# Patient Record
Sex: FEMALE | Race: WHITE | NOT HISPANIC OR LATINO | URBAN - METROPOLITAN AREA
[De-identification: names, ages, dates, MRNs, and addresses within clinical notes are randomized per-mention and may not be internally consistent; named-entity substitution may affect disease eponyms.]

---

## 2024-07-02 ENCOUNTER — APPOINTMENT (RX ONLY)
Dept: URBAN - METROPOLITAN AREA CLINIC 94 | Facility: CLINIC | Age: 64
Setting detail: DERMATOLOGY
End: 2024-07-02

## 2024-07-02 DIAGNOSIS — Z41.9 ENCOUNTER FOR PROCEDURE FOR PURPOSES OTHER THAN REMEDYING HEALTH STATE, UNSPECIFIED: ICD-10-CM | Status: INADEQUATELY CONTROLLED

## 2024-07-02 PROCEDURE — ? COMPRESSION STOCKING FITTING

## 2024-07-02 PROCEDURE — ? PHOTO-DOCUMENTATION

## 2024-07-02 PROCEDURE — ? COSMETIC CONSULTATION: SCLEROTHERAPY

## 2024-07-02 PROCEDURE — ? COUNSELING

## 2024-07-02 PROCEDURE — ? OTC TREATMENT REGIMEN

## 2024-07-02 ASSESSMENT — LOCATION ZONE DERM: LOCATION ZONE: LEG

## 2024-07-02 ASSESSMENT — LOCATION DETAILED DESCRIPTION DERM
LOCATION DETAILED: LEFT PROXIMAL PRETIBIAL REGION
LOCATION DETAILED: RIGHT ANTERIOR DISTAL THIGH

## 2024-07-02 ASSESSMENT — LOCATION SIMPLE DESCRIPTION DERM
LOCATION SIMPLE: RIGHT THIGH
LOCATION SIMPLE: LEFT PRETIBIAL REGION

## 2024-07-02 NOTE — PROCEDURE: COMPRESSION STOCKING FITTING
Stocking Type: Stockings
Strength: 20-30 mmHg
Toe Type: Open
Detail Level: Simple
Specific Size Ordered: small
Pantihose Type: Part A

## 2024-07-02 NOTE — PROCEDURE: OTC TREATMENT REGIMEN
Patient Specific Otc Recommendations (Will Not Stick From Patient To Patient): Dermaka cream
Detail Level: Zone

## 2024-07-02 NOTE — HPI: VEIN EVALUATION
[id number]
Do You Have A Family History Of Bleeding Disorders?: no
How Severe Is/Are Your Symptoms?: moderate
Is This A New Presentation, Or A Follow-Up?: Vein Evaluation
Additional History: She has not worn compression stockings. Most recent cosmetic treatments were 7 years ago locally. Four pregnancies.
Family History Of Vein Disease (Include Family Member And Type Of Vein Disease):: Parents
Referred By:: In office advertising
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

## 2024-07-15 ENCOUNTER — APPOINTMENT (RX ONLY)
Dept: URBAN - METROPOLITAN AREA CLINIC 95 | Facility: CLINIC | Age: 64
Setting detail: DERMATOLOGY
End: 2024-07-15

## 2024-07-15 DIAGNOSIS — I83.9 ASYMPTOMATIC VARICOSE VEINS OF LOWER EXTREMITIES: ICD-10-CM

## 2024-07-15 DIAGNOSIS — Z41.9 ENCOUNTER FOR PROCEDURE FOR PURPOSES OTHER THAN REMEDYING HEALTH STATE, UNSPECIFIED: ICD-10-CM

## 2024-07-15 PROBLEM — I83.90 ASYMPTOMATIC VARICOSE VEINS OF UNSPECIFIED LOWER EXTREMITY: Status: ACTIVE | Noted: 2024-07-15

## 2024-07-15 PROBLEM — I83.91 ASYMPTOMATIC VARICOSE VEINS OF RIGHT LOWER EXTREMITY: Status: ACTIVE | Noted: 2024-07-15

## 2024-07-15 PROCEDURE — ? OTHER

## 2024-07-15 PROCEDURE — ? SCLEROTHERAPY (COSMETIC)

## 2024-07-15 ASSESSMENT — LOCATION DETAILED DESCRIPTION DERM
LOCATION DETAILED: RIGHT PROXIMAL CALF
LOCATION DETAILED: RIGHT POSTERIOR ANKLE
LOCATION DETAILED: RIGHT DISTAL POSTERIOR THIGH
LOCATION DETAILED: RIGHT POPLITEAL SKIN
LOCATION DETAILED: RIGHT DISTAL MEDIAL CALF
LOCATION DETAILED: RIGHT MEDIAL POSTERIOR ANKLE
LOCATION DETAILED: RIGHT DISTAL CALF
LOCATION DETAILED: RIGHT DISTAL LATERAL CALF

## 2024-07-15 ASSESSMENT — LOCATION SIMPLE DESCRIPTION DERM
LOCATION SIMPLE: RIGHT POPLITEAL SKIN
LOCATION SIMPLE: RIGHT CALF
LOCATION SIMPLE: RIGHT ANKLE
LOCATION SIMPLE: RIGHT POSTERIOR THIGH

## 2024-07-15 ASSESSMENT — LOCATION ZONE DERM: LOCATION ZONE: LEG

## 2024-07-15 NOTE — PROCEDURE: SCLEROTHERAPY (COSMETIC)
Sclerosant Volume (Cc): 0
Price (Use Numbers Only, No Special Characters Or $): 300.00
Sclerosant Volume (Cc): 3
Sclerosant (A) %: 0.25
Detail Level: Detailed
Consent: Photographs were taken.

## 2024-07-15 NOTE — PROCEDURE: OTHER
Render Risk Assessment In Note?: no
Detail Level: Zone
Note Text (......Xxx Chief Complaint.): This diagnosis correlates with the
Other (Free Text): Compression bandage with 4x4 gauze and either Hypafix or Kerlix and Coban wrap was applied to superficial area of vasculature on area of concern after injection of sclerosant. Patient instructed to keep dressing in place for 24 hours then remove. Patient instructed not to allow bandage to get wet during that time. Educated patient to remove dressing sooner if there are any changes to skin color or limb sensation including but not limited to pallor, cyanosis, numbness, tingling and/or burning. All questions answered, no further questions at this time, patient verbalized understanding.

## 2024-08-29 ENCOUNTER — APPOINTMENT (RX ONLY)
Dept: URBAN - METROPOLITAN AREA CLINIC 95 | Facility: CLINIC | Age: 64
Setting detail: DERMATOLOGY
End: 2024-08-29

## 2024-08-29 DIAGNOSIS — I83.9 ASYMPTOMATIC VARICOSE VEINS OF LOWER EXTREMITIES: ICD-10-CM

## 2024-08-29 DIAGNOSIS — Z41.9 ENCOUNTER FOR PROCEDURE FOR PURPOSES OTHER THAN REMEDYING HEALTH STATE, UNSPECIFIED: ICD-10-CM

## 2024-08-29 DIAGNOSIS — I97.618 POSTPROCEDURAL HEMORRHAGE OF A CIRCULATORY SYSTEM ORGAN OR STRUCTURE FOLLOWING OTHER CIRCULATORY SYSTEM PROCEDURE: ICD-10-CM

## 2024-08-29 PROBLEM — I83.90 ASYMPTOMATIC VARICOSE VEINS OF UNSPECIFIED LOWER EXTREMITY: Status: ACTIVE | Noted: 2024-08-29

## 2024-08-29 PROCEDURE — ? SCLEROTHERAPY (COSMETIC)

## 2024-08-29 ASSESSMENT — LOCATION DETAILED DESCRIPTION DERM
LOCATION DETAILED: RIGHT PROXIMAL CALF
LOCATION DETAILED: RIGHT PROXIMAL MEDIAL CALF
LOCATION DETAILED: RIGHT POPLITEAL SKIN
LOCATION DETAILED: RIGHT POPLITEAL SKIN
LOCATION DETAILED: RIGHT DISTAL CALF
LOCATION DETAILED: RIGHT DISTAL POSTERIOR THIGH

## 2024-08-29 ASSESSMENT — LOCATION ZONE DERM
LOCATION ZONE: LEG
LOCATION ZONE: LEG

## 2024-08-29 ASSESSMENT — LOCATION SIMPLE DESCRIPTION DERM
LOCATION SIMPLE: RIGHT CALF
LOCATION SIMPLE: RIGHT POPLITEAL SKIN
LOCATION SIMPLE: RIGHT POSTERIOR THIGH
LOCATION SIMPLE: RIGHT POPLITEAL SKIN

## 2024-08-29 NOTE — PROCEDURE: SCLEROTHERAPY (COSMETIC)
Number Of Injections (Sclerosant 3): 0
Sclerosant Volume (Cc): 3
Sclerosant (A) %: 0.25
Price (Use Numbers Only, No Special Characters Or $): 300.00
Post-Care Instructions: Post-operative instructions given verbally and in writing. Instructed to wear the compression stockings during day time hours for 2 days post procedure.  No heavy lifting over 25 lbs and no strenuous exercising or anything high impact for 2 days. Ok to shower unless otherwise instructed. Do not soak in anything for 2 days such as swimming pool, bath tub or open bodies of water. No flying for 24 hours. No sun exposure for a week. Patient was given phone number to the office to contact with any issues or concerns. All questions answered, no further questions at this time. Patient verbalized understanding of all instructions.
Detail Level: Detailed

## 2024-09-13 ENCOUNTER — APPOINTMENT (RX ONLY)
Dept: URBAN - METROPOLITAN AREA CLINIC 95 | Facility: CLINIC | Age: 64
Setting detail: DERMATOLOGY
End: 2024-09-13

## 2024-09-13 DIAGNOSIS — I83.9 ASYMPTOMATIC VARICOSE VEINS OF LOWER EXTREMITIES: ICD-10-CM

## 2024-09-13 DIAGNOSIS — Z41.9 ENCOUNTER FOR PROCEDURE FOR PURPOSES OTHER THAN REMEDYING HEALTH STATE, UNSPECIFIED: ICD-10-CM

## 2024-09-13 DIAGNOSIS — I97.618 POSTPROCEDURAL HEMORRHAGE OF A CIRCULATORY SYSTEM ORGAN OR STRUCTURE FOLLOWING OTHER CIRCULATORY SYSTEM PROCEDURE: ICD-10-CM

## 2024-09-13 PROBLEM — I83.90 ASYMPTOMATIC VARICOSE VEINS OF UNSPECIFIED LOWER EXTREMITY: Status: ACTIVE | Noted: 2024-09-13

## 2024-09-13 PROCEDURE — ? SCLEROTHERAPY (COSMETIC)

## 2024-09-13 ASSESSMENT — LOCATION SIMPLE DESCRIPTION DERM
LOCATION SIMPLE: RIGHT PRETIBIAL REGION
LOCATION SIMPLE: RIGHT KNEE
LOCATION SIMPLE: RIGHT LOWER LEG
LOCATION SIMPLE: RIGHT THIGH
LOCATION SIMPLE: RIGHT KNEE
LOCATION SIMPLE: RIGHT THIGH

## 2024-09-13 ASSESSMENT — LOCATION DETAILED DESCRIPTION DERM
LOCATION DETAILED: RIGHT LATERAL PROXIMAL CALF
LOCATION DETAILED: RIGHT ANTERIOR MEDIAL DISTAL THIGH
LOCATION DETAILED: RIGHT DISTAL PRETIBIAL REGION
LOCATION DETAILED: RIGHT ANTERIOR PROXIMAL THIGH
LOCATION DETAILED: RIGHT MEDIAL KNEE
LOCATION DETAILED: RIGHT PROXIMAL PRETIBIAL REGION
LOCATION DETAILED: RIGHT POSTERIOR LATERAL DISTAL THIGH
LOCATION DETAILED: RIGHT ANTERIOR DISTAL THIGH
LOCATION DETAILED: RIGHT LATERAL KNEE

## 2024-09-13 ASSESSMENT — LOCATION ZONE DERM
LOCATION ZONE: LEG
LOCATION ZONE: LEG

## 2024-09-13 NOTE — PROCEDURE: SCLEROTHERAPY (COSMETIC)
Sclerosant Volume (Cc): 0
Price (Use Numbers Only, No Special Characters Or $): 300.00
Sclerosant Volume (Cc): 3
Post-Care Instructions: Post-operative instructions given verbally and in writing. Instructed to wear the compression stockings during day time hours for 2 days post procedure.  No heavy lifting over 25 lbs and no strenuous exercising or anything high impact for 2 days. Ok to shower unless otherwise instructed. Do not soak in anything for 2 days such as swimming pool, bath tub or open bodies of water. No flying for 24 hours. No sun exposure for a week. Patient was given phone number to the office to contact with any issues or concerns. All questions answered, no further questions at this time. Patient verbalized understanding of all instructions.
Detail Level: Detailed
Sclerosant (A) %: 0.25

## 2024-10-21 ENCOUNTER — APPOINTMENT (RX ONLY)
Dept: URBAN - METROPOLITAN AREA CLINIC 95 | Facility: CLINIC | Age: 64
Setting detail: DERMATOLOGY
End: 2024-10-21

## 2024-10-21 DIAGNOSIS — Z41.9 ENCOUNTER FOR PROCEDURE FOR PURPOSES OTHER THAN REMEDYING HEALTH STATE, UNSPECIFIED: ICD-10-CM

## 2024-10-21 PROCEDURE — ? SCLEROTHERAPY (COSMETIC)

## 2024-10-21 ASSESSMENT — LOCATION DETAILED DESCRIPTION DERM
LOCATION DETAILED: LEFT POSTERIOR ANKLE
LOCATION DETAILED: LEFT PROXIMAL LATERAL CALF
LOCATION DETAILED: LEFT POPLITEAL SKIN
LOCATION DETAILED: LEFT DISTAL CALF
LOCATION DETAILED: LEFT DISTAL LATERAL CALF
LOCATION DETAILED: LEFT MEDIAL POPLITEAL SKIN
LOCATION DETAILED: LEFT PROXIMAL CALF

## 2024-10-21 ASSESSMENT — LOCATION ZONE DERM: LOCATION ZONE: LEG

## 2024-10-21 ASSESSMENT — LOCATION SIMPLE DESCRIPTION DERM
LOCATION SIMPLE: LEFT CALF
LOCATION SIMPLE: LEFT POPLITEAL SKIN
LOCATION SIMPLE: LEFT ANKLE

## 2024-10-21 NOTE — PROCEDURE: SCLEROTHERAPY (COSMETIC)
Number Of Injections (Sclerosant 1): 0
Price (Use Numbers Only, No Special Characters Or $): 300.00
Sclerosant Volume (Cc): 2
Sclerosant (A) %: 0.25
Sclerosant Volume (Cc): 3
Detail Level: Detailed
Sclerosant Volume (Cc): 10

## 2024-10-31 ENCOUNTER — APPOINTMENT (RX ONLY)
Dept: URBAN - METROPOLITAN AREA CLINIC 95 | Facility: CLINIC | Age: 64
Setting detail: DERMATOLOGY
End: 2024-10-31

## 2024-10-31 DIAGNOSIS — Z41.9 ENCOUNTER FOR PROCEDURE FOR PURPOSES OTHER THAN REMEDYING HEALTH STATE, UNSPECIFIED: ICD-10-CM

## 2024-10-31 DIAGNOSIS — I97.618 POSTPROCEDURAL HEMORRHAGE OF A CIRCULATORY SYSTEM ORGAN OR STRUCTURE FOLLOWING OTHER CIRCULATORY SYSTEM PROCEDURE: ICD-10-CM

## 2024-10-31 DIAGNOSIS — I83.9 ASYMPTOMATIC VARICOSE VEINS OF LOWER EXTREMITIES: ICD-10-CM

## 2024-10-31 PROBLEM — I83.90 ASYMPTOMATIC VARICOSE VEINS OF UNSPECIFIED LOWER EXTREMITY: Status: ACTIVE | Noted: 2024-10-31

## 2024-10-31 PROCEDURE — ? SCLEROTHERAPY (COSMETIC)

## 2024-10-31 ASSESSMENT — LOCATION DETAILED DESCRIPTION DERM
LOCATION DETAILED: LEFT PROXIMAL CALF
LOCATION DETAILED: LEFT ANKLE
LOCATION DETAILED: LEFT MEDIAL POPLITEAL SKIN
LOCATION DETAILED: LEFT DISTAL POSTERIOR THIGH
LOCATION DETAILED: LEFT POPLITEAL SKIN
LOCATION DETAILED: LEFT DISTAL PRETIBIAL REGION
LOCATION DETAILED: LEFT ANTERIOR DISTAL THIGH

## 2024-10-31 ASSESSMENT — LOCATION SIMPLE DESCRIPTION DERM
LOCATION SIMPLE: LEFT ANKLE
LOCATION SIMPLE: LEFT POPLITEAL SKIN
LOCATION SIMPLE: LEFT PRETIBIAL REGION
LOCATION SIMPLE: LEFT POPLITEAL SKIN
LOCATION SIMPLE: LEFT CALF
LOCATION SIMPLE: LEFT THIGH
LOCATION SIMPLE: LEFT POSTERIOR THIGH

## 2024-10-31 ASSESSMENT — LOCATION ZONE DERM
LOCATION ZONE: LEG
LOCATION ZONE: LEG

## 2024-10-31 NOTE — PROCEDURE: SCLEROTHERAPY (COSMETIC)
Number Of Injections (Sclerosant 3): 0
Detail Level: Zone
Price (Use Numbers Only, No Special Characters Or $): 300.00
Sclerosant (A) %: 0.25
Sclerosant Volume (Cc): 3
Post-Care Instructions: Post-operative instructions given verbally and in writing. Instructed to wear the compression stockings during day time hours for 2 days post procedure.  No heavy lifting over 25 lbs and no strenuous exercising or anything high impact for 2 days. Ok to shower unless otherwise instructed. Do not soak in anything for 2 days such as swimming pool, bath tub or open bodies of water. No flying for 24 hours. No sun exposure for a week. Patient was given phone number to the office to contact with any issues or concerns. All questions answered, no further questions at this time. Patient verbalized understanding of all instructions.